# Patient Record
Sex: FEMALE | Race: WHITE
[De-identification: names, ages, dates, MRNs, and addresses within clinical notes are randomized per-mention and may not be internally consistent; named-entity substitution may affect disease eponyms.]

---

## 2020-03-06 ENCOUNTER — HOSPITAL ENCOUNTER (EMERGENCY)
Dept: HOSPITAL 92 - ERS | Age: 30
Discharge: HOME | End: 2020-03-06
Payer: MEDICAID

## 2020-03-06 DIAGNOSIS — Z79.899: ICD-10-CM

## 2020-03-06 DIAGNOSIS — R55: Primary | ICD-10-CM

## 2020-03-06 DIAGNOSIS — F17.210: ICD-10-CM

## 2020-03-06 DIAGNOSIS — G43.909: ICD-10-CM

## 2020-03-06 DIAGNOSIS — F41.9: ICD-10-CM

## 2020-03-06 DIAGNOSIS — F32.9: ICD-10-CM

## 2020-03-06 DIAGNOSIS — N39.0: ICD-10-CM

## 2020-03-06 DIAGNOSIS — D64.9: ICD-10-CM

## 2020-03-06 LAB
ALBUMIN SERPL BCG-MCNC: 4 G/DL (ref 3.5–5)
ALP SERPL-CCNC: 94 U/L (ref 40–110)
ALT SERPL W P-5'-P-CCNC: 18 U/L (ref 8–55)
ANION GAP SERPL CALC-SCNC: 12 MMOL/L (ref 10–20)
AST SERPL-CCNC: 22 U/L (ref 5–34)
BACTERIA UR QL AUTO: (no result) HPF
BASOPHILS # BLD AUTO: 0 THOU/UL (ref 0–0.2)
BASOPHILS NFR BLD AUTO: 0.3 % (ref 0–1)
BILIRUB SERPL-MCNC: 0.9 MG/DL (ref 0.2–1.2)
BUN SERPL-MCNC: 13 MG/DL (ref 7–18.7)
CALCIUM SERPL-MCNC: 9.1 MG/DL (ref 7.8–10.44)
CHLORIDE SERPL-SCNC: 109 MMOL/L (ref 98–107)
CO2 SERPL-SCNC: 24 MMOL/L (ref 22–29)
CREAT CL PREDICTED SERPL C-G-VRATE: 0 ML/MIN (ref 70–130)
EOSINOPHIL # BLD AUTO: 0.1 THOU/UL (ref 0–0.7)
EOSINOPHIL NFR BLD AUTO: 0.6 % (ref 0–10)
GLOBULIN SER CALC-MCNC: 3.4 G/DL (ref 2.4–3.5)
GLUCOSE SERPL-MCNC: 109 MG/DL (ref 70–105)
HGB BLD-MCNC: 12.7 G/DL (ref 12–16)
LEUKOCYTE ESTERASE UR QL STRIP.AUTO: 500 LEU/UL
LYMPHOCYTES # BLD: 1.3 THOU/UL (ref 1.2–3.4)
LYMPHOCYTES NFR BLD AUTO: 14.7 % (ref 21–51)
MCH RBC QN AUTO: 29 PG (ref 27–31)
MCV RBC AUTO: 88.4 FL (ref 78–98)
MONOCYTES # BLD AUTO: 0.5 THOU/UL (ref 0.11–0.59)
MONOCYTES NFR BLD AUTO: 6.1 % (ref 0–10)
NEUTROPHILS # BLD AUTO: 6.7 THOU/UL (ref 1.4–6.5)
NEUTROPHILS NFR BLD AUTO: 78.3 % (ref 42–75)
PLATELET # BLD AUTO: 175 THOU/UL (ref 130–400)
POTASSIUM SERPL-SCNC: 3.8 MMOL/L (ref 3.5–5.1)
PREGS CONTROL BACKGROUND?: (no result)
PREGS CONTROL BAR APPEAR?: YES
PROT UR STRIP.AUTO-MCNC: 10 MG/DL
RBC # BLD AUTO: 4.4 MILL/UL (ref 4.2–5.4)
SODIUM SERPL-SCNC: 141 MMOL/L (ref 136–145)
WBC # BLD AUTO: 8.6 THOU/UL (ref 4.8–10.8)

## 2020-03-06 PROCEDURE — 81015 MICROSCOPIC EXAM OF URINE: CPT

## 2020-03-06 PROCEDURE — 84703 CHORIONIC GONADOTROPIN ASSAY: CPT

## 2020-03-06 PROCEDURE — 36415 COLL VENOUS BLD VENIPUNCTURE: CPT

## 2020-03-06 PROCEDURE — 81003 URINALYSIS AUTO W/O SCOPE: CPT

## 2020-03-06 PROCEDURE — 93005 ELECTROCARDIOGRAM TRACING: CPT

## 2020-03-06 PROCEDURE — 80053 COMPREHEN METABOLIC PANEL: CPT

## 2020-03-06 PROCEDURE — 85025 COMPLETE CBC W/AUTO DIFF WBC: CPT

## 2020-03-07 ENCOUNTER — HOSPITAL ENCOUNTER (EMERGENCY)
Dept: HOSPITAL 92 - ERS | Age: 30
Discharge: HOME | End: 2020-03-07
Payer: MEDICAID

## 2020-03-07 DIAGNOSIS — F41.9: ICD-10-CM

## 2020-03-07 DIAGNOSIS — F32.9: ICD-10-CM

## 2020-03-07 DIAGNOSIS — F17.210: ICD-10-CM

## 2020-03-07 DIAGNOSIS — D64.9: ICD-10-CM

## 2020-03-07 DIAGNOSIS — F10.129: Primary | ICD-10-CM

## 2020-03-07 DIAGNOSIS — G43.909: ICD-10-CM

## 2020-03-07 DIAGNOSIS — Z79.899: ICD-10-CM
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
## 2020-03-07 LAB
ALBUMIN SERPL BCG-MCNC: 3.9 G/DL (ref 3.5–5)
ALP SERPL-CCNC: 96 U/L (ref 40–110)
ALT SERPL W P-5'-P-CCNC: 18 U/L (ref 8–55)
ANION GAP SERPL CALC-SCNC: 12 MMOL/L (ref 10–20)
APAP SERPL-MCNC: (no result) MCG/ML (ref 10–30)
AST SERPL-CCNC: 22 U/L (ref 5–34)
BACTERIA UR QL AUTO: (no result) HPF
BASOPHILS # BLD AUTO: 0 THOU/UL (ref 0–0.2)
BASOPHILS NFR BLD AUTO: 0.6 % (ref 0–1)
BILIRUB SERPL-MCNC: 0.5 MG/DL (ref 0.2–1.2)
BUN SERPL-MCNC: 8 MG/DL (ref 7–18.7)
CALCIUM SERPL-MCNC: 8.4 MG/DL (ref 7.8–10.44)
CHLORIDE SERPL-SCNC: 113 MMOL/L (ref 98–107)
CO2 SERPL-SCNC: 25 MMOL/L (ref 22–29)
CREAT CL PREDICTED SERPL C-G-VRATE: 0 ML/MIN (ref 70–130)
DRUG SCREEN CUTOFF: (no result)
EOSINOPHIL # BLD AUTO: 0.1 THOU/UL (ref 0–0.7)
EOSINOPHIL NFR BLD AUTO: 1 % (ref 0–10)
GLOBULIN SER CALC-MCNC: 3.4 G/DL (ref 2.4–3.5)
GLUCOSE SERPL-MCNC: 101 MG/DL (ref 70–105)
HGB BLD-MCNC: 12.5 G/DL (ref 12–16)
LEUKOCYTE ESTERASE UR QL STRIP.AUTO: 500 LEU/UL
LYMPHOCYTES # BLD: 1.9 THOU/UL (ref 1.2–3.4)
LYMPHOCYTES NFR BLD AUTO: 21.7 % (ref 21–51)
MCH RBC QN AUTO: 29.5 PG (ref 27–31)
MCV RBC AUTO: 87.6 FL (ref 78–98)
MEDTOX CONTROL LINE VALID?: (no result)
MEDTOX READER #: (no result)
MONOCYTES # BLD AUTO: 0.5 THOU/UL (ref 0.11–0.59)
MONOCYTES NFR BLD AUTO: 5.9 % (ref 0–10)
NEUTROPHILS # BLD AUTO: 6.1 THOU/UL (ref 1.4–6.5)
NEUTROPHILS NFR BLD AUTO: 70.9 % (ref 42–75)
PLATELET # BLD AUTO: 175 THOU/UL (ref 130–400)
POTASSIUM SERPL-SCNC: 3.9 MMOL/L (ref 3.5–5.1)
PREGS CONTROL BACKGROUND?: (no result)
PREGS CONTROL BAR APPEAR?: YES
RBC # BLD AUTO: 4.23 MILL/UL (ref 4.2–5.4)
RBC UR QL AUTO: (no result) HPF (ref 0–3)
SALICYLATES SERPL-MCNC: (no result) MG/DL (ref 15–30)
SODIUM SERPL-SCNC: 146 MMOL/L (ref 136–145)
WBC # BLD AUTO: 8.6 THOU/UL (ref 4.8–10.8)
WBC UR QL AUTO: (no result) HPF (ref 0–3)

## 2020-03-07 PROCEDURE — A4353 INTERMITTENT URINARY CATH: HCPCS

## 2020-03-07 PROCEDURE — 36415 COLL VENOUS BLD VENIPUNCTURE: CPT

## 2020-03-07 PROCEDURE — 81003 URINALYSIS AUTO W/O SCOPE: CPT

## 2020-03-07 PROCEDURE — 80307 DRUG TEST PRSMV CHEM ANLYZR: CPT

## 2020-03-07 PROCEDURE — 80053 COMPREHEN METABOLIC PANEL: CPT

## 2020-03-07 PROCEDURE — 93005 ELECTROCARDIOGRAM TRACING: CPT

## 2020-03-07 PROCEDURE — 51701 INSERT BLADDER CATHETER: CPT

## 2020-03-07 PROCEDURE — 85025 COMPLETE CBC W/AUTO DIFF WBC: CPT

## 2020-03-07 PROCEDURE — 80306 DRUG TEST PRSMV INSTRMNT: CPT

## 2020-03-07 PROCEDURE — 84703 CHORIONIC GONADOTROPIN ASSAY: CPT

## 2020-03-14 NOTE — EKG
Test Reason : 

Blood Pressure : ***/*** mmHG

Vent. Rate : 130 BPM     Atrial Rate : 130 BPM

   P-R Int : 144 ms          QRS Dur : 084 ms

    QT Int : 304 ms       P-R-T Axes : [phone] degrees

   QTc Int : 447 ms

 

Sinus tachycardia

Septal infarct , age undetermined

Abnormal ECG

 

Confirmed by SHI YANES ([id number]),  MAHOGANY FLORES ([id number]) on 3/14/2020 12:21:15 PM

 

Referred By:             Confirmed By:SHI AMBROSIO